# Patient Record
Sex: MALE | Race: WHITE
[De-identification: names, ages, dates, MRNs, and addresses within clinical notes are randomized per-mention and may not be internally consistent; named-entity substitution may affect disease eponyms.]

---

## 2018-01-22 ENCOUNTER — HOSPITAL ENCOUNTER (EMERGENCY)
Dept: HOSPITAL 96 - M.ERS | Age: 38
Discharge: HOME | End: 2018-01-22
Payer: COMMERCIAL

## 2018-01-22 VITALS — SYSTOLIC BLOOD PRESSURE: 134 MMHG | DIASTOLIC BLOOD PRESSURE: 80 MMHG

## 2018-01-22 VITALS — BODY MASS INDEX: 29.12 KG/M2 | WEIGHT: 214.99 LBS | HEIGHT: 72 IN

## 2018-01-22 DIAGNOSIS — Z90.49: ICD-10-CM

## 2018-01-22 DIAGNOSIS — J09.X2: Primary | ICD-10-CM

## 2018-01-22 LAB
ABSOLUTE BASOPHILS: 0.1 THOU/UL (ref 0–0.2)
ABSOLUTE EOSINOPHILS: 0.1 THOU/UL (ref 0–0.7)
ABSOLUTE MONOCYTES: 0.6 THOU/UL (ref 0–1.2)
ALBUMIN SERPL-MCNC: 3.7 G/DL (ref 3.4–5)
ALP SERPL-CCNC: 87 U/L (ref 46–116)
ALT SERPL-CCNC: 45 U/L (ref 30–65)
ANION GAP SERPL CALC-SCNC: 10 MMOL/L (ref 7–16)
AST SERPL-CCNC: 29 U/L (ref 15–37)
BASOPHILS NFR BLD AUTO: 2 %
BILIRUB SERPL-MCNC: 0.4 MG/DL
BUN SERPL-MCNC: 11 MG/DL (ref 7–18)
CALCIUM SERPL-MCNC: 8.6 MG/DL (ref 8.5–10.1)
CHLORIDE SERPL-SCNC: 102 MMOL/L (ref 98–107)
CO2 SERPL-SCNC: 27 MMOL/L (ref 21–32)
CREAT SERPL-MCNC: 1 MG/DL (ref 0.6–1.3)
EOSINOPHIL NFR BLD: 1 %
GLUCOSE SERPL-MCNC: 104 MG/DL (ref 70–99)
GRANULOCYTES NFR BLD MANUAL: 74 %
HCT VFR BLD CALC: 42.9 % (ref 42–52)
HGB BLD-MCNC: 15.2 GM/DL (ref 14–18)
LYMPHOCYTES # BLD: 1 THOU/UL (ref 0.8–5.3)
LYMPHOCYTES NFR BLD AUTO: 15 %
MCH RBC QN AUTO: 31.1 PG (ref 26–34)
MCHC RBC AUTO-ENTMCNC: 35.5 G/DL (ref 28–37)
MCV RBC: 87.8 FL (ref 80–100)
MONOCYTES NFR BLD: 8 %
MPV: 10.3 FL. (ref 7.2–11.1)
NEUTROPHILS # BLD: 5.1 THOU/UL (ref 1.6–8.1)
NUCLEATED RBCS: 0 /100WBC
PLATELET # BLD EST: ADEQUATE 10*3/UL
PLATELET COUNT*: 166 THOU/UL (ref 150–400)
POTASSIUM SERPL-SCNC: 3.8 MMOL/L (ref 3.5–5.1)
PROT SERPL-MCNC: 6.8 G/DL (ref 6.4–8.2)
RBC # BLD AUTO: 4.88 MIL/UL (ref 4.5–6)
RBC MORPH BLD: NORMAL
RDW-CV: 13.1 % (ref 10.5–14.5)
SODIUM SERPL-SCNC: 139 MMOL/L (ref 136–145)
WBC # BLD AUTO: 6.9 THOU/UL (ref 4–11)

## 2018-07-10 ENCOUNTER — HOSPITAL ENCOUNTER (EMERGENCY)
Dept: HOSPITAL 96 - M.ERS | Age: 38
Discharge: HOME | End: 2018-07-10
Payer: COMMERCIAL

## 2018-07-10 VITALS — DIASTOLIC BLOOD PRESSURE: 80 MMHG | SYSTOLIC BLOOD PRESSURE: 136 MMHG

## 2018-07-10 VITALS — BODY MASS INDEX: 29.66 KG/M2 | HEIGHT: 72 IN | WEIGHT: 219.01 LBS

## 2018-07-10 DIAGNOSIS — M25.512: Primary | ICD-10-CM

## 2018-07-10 DIAGNOSIS — Z90.49: ICD-10-CM

## 2018-07-10 LAB
ABSOLUTE BASOPHILS: 0.1 THOU/UL (ref 0–0.2)
ABSOLUTE EOSINOPHILS: 0.3 THOU/UL (ref 0–0.7)
ABSOLUTE MONOCYTES: 0.7 THOU/UL (ref 0–1.2)
ALBUMIN SERPL-MCNC: 3.8 G/DL (ref 3.4–5)
ALP SERPL-CCNC: 87 U/L (ref 46–116)
ALT SERPL-CCNC: 49 U/L (ref 30–65)
ANION GAP SERPL CALC-SCNC: 2 MMOL/L (ref 7–16)
APTT BLD: 27.3 SECONDS (ref 25–31.3)
AST SERPL-CCNC: 31 U/L (ref 15–37)
BASOPHILS NFR BLD AUTO: 0.8 %
BILIRUB SERPL-MCNC: 0.3 MG/DL
BUN SERPL-MCNC: 19 MG/DL (ref 7–18)
CALCIUM SERPL-MCNC: 8.7 MG/DL (ref 8.5–10.1)
CHLORIDE SERPL-SCNC: 106 MMOL/L (ref 98–107)
CK-MB MASS: 2.1 NG/ML
CO2 SERPL-SCNC: 29 MMOL/L (ref 21–32)
CREAT SERPL-MCNC: 0.9 MG/DL (ref 0.6–1.3)
EOSINOPHIL NFR BLD: 3.7 %
GLUCOSE SERPL-MCNC: 98 MG/DL (ref 70–99)
GRANULOCYTES NFR BLD MANUAL: 63.1 %
HCT VFR BLD CALC: 44.3 % (ref 42–52)
HGB BLD-MCNC: 15.1 GM/DL (ref 14–18)
INR PPP: 1
LIPASE: 106 U/L (ref 73–393)
LYMPHOCYTES # BLD: 2 THOU/UL (ref 0.8–5.3)
LYMPHOCYTES NFR BLD AUTO: 24.6 %
MAGNESIUM SERPL-MCNC: 2 MG/DL (ref 1.8–2.4)
MCH RBC QN AUTO: 31.5 PG (ref 26–34)
MCHC RBC AUTO-ENTMCNC: 34 G/DL (ref 28–37)
MCV RBC: 92.5 FL (ref 80–100)
MONOCYTES NFR BLD: 7.8 %
MPV: 10.6 FL. (ref 7.2–11.1)
NEUTROPHILS # BLD: 5.3 THOU/UL (ref 1.6–8.1)
NT-PRO BRAIN NAT PEPTIDE: 27 PG/ML (ref ?–300)
NUCLEATED RBCS: 0 /100WBC
PLATELET COUNT*: 191 THOU/UL (ref 150–400)
POTASSIUM SERPL-SCNC: 3.8 MMOL/L (ref 3.5–5.1)
PROT SERPL-MCNC: 7.2 G/DL (ref 6.4–8.2)
PROTHROMBIN TIME: 10.1 SECONDS (ref 9.2–11.5)
RBC # BLD AUTO: 4.79 MIL/UL (ref 4.5–6)
RDW-CV: 13.1 % (ref 10.5–14.5)
SODIUM SERPL-SCNC: 137 MMOL/L (ref 136–145)
TROPONIN-I LEVEL: <0.06 NG/ML (ref ?–0.06)
WBC # BLD AUTO: 8.3 THOU/UL (ref 4–11)

## 2018-07-12 NOTE — EKG
[place] [address]
Phone:  [phone]                     ELECTROCARDIOGRAM REPORT      
_______________________________________________________________________________
 
Name:       VERENA BARFIELD JR              Room:                      [facility]#:  [id number]      Account #:      [id number]  
Admission:  07/10/18     Attend Phys:                         
Discharge:  07/10/18     Date of Birth:  [birth year]  
         Report #: [id number]
    [id number]
_______________________________________________________________________________
THIS REPORT FOR:  //name//                      
 
                         [facility] ED
                                       
Test Date:    2018-07-10               Test Time:    17:55:19
Pat Name:     VERENA BARFIELD             Department:   
Patient ID:   [id number]            Room:          
Gender:       M                        Technician:   
:          1980               Requested By: Jose Eduardo Jackson
Order Number: [id number] MD:   Jomar Loja
                                 Measurements
Intervals                              Axis          
Rate:         71                       P:            -15
WY:           122                      QRS:          58
QRSD:         97                       T:            2
QT:           357                                    
QTc:          388                                    
                           Interpretive Statements
Sinus rhythm
No previous ECG available for comparison
 
Electronically Signed On 2018 12:53:13 CDT by Jomar Loja
[email or web address]
 
 
 
 
 
 
 
 
 
 
 
 
 
 
 
 
 
 
 
 
  <ELECTRONICALLY SIGNED>
                                           By: Jomar Loja MD, [facility]     
  18     1253
D: 07/10/18 1755   _____________________________________
T: 07/10/18 1755   Jomar Loja MD, FACC       /EPI

## 2018-08-21 ENCOUNTER — HOSPITAL ENCOUNTER (OUTPATIENT)
Dept: HOSPITAL 96 - M.CRD | Age: 38
End: 2018-08-21
Attending: INTERNAL MEDICINE
Payer: COMMERCIAL

## 2018-08-21 DIAGNOSIS — I65.23: Primary | ICD-10-CM

## 2018-08-21 DIAGNOSIS — R07.9: ICD-10-CM

## 2018-08-21 DIAGNOSIS — E78.5: ICD-10-CM

## 2018-08-21 LAB
CHOLEST SERPL-MCNC: 221 MG/DL (ref ?–200)
HDLC SERPL-MCNC: 36 MG/DL (ref 40–?)
LDLC SERPL-MCNC: 168 MG/DL (ref ?–100)
TC:HDL: 6.1 RATIO
TRIGL SERPL-MCNC: 87 MG/DL (ref ?–150)
VLDLC SERPL CALC-MCNC: 17 MG/DL (ref ?–40)

## 2018-08-21 NOTE — EXE
[place] [address]
Phone:  [phone]                     STRESS ECHOCARDIOGRAM         
_______________________________________________________________________________
 
Name:         VERENA BARFIELD JR             Room:                     REG CLI
M.R.#:    [id number]     Account #:     [id number]  
Admission:    08/21/18    Attend Phys:   Jason Sanches,
Discharge:                Date of Birth: [birth year]  
Date of Service: 08/21/18 1627  Report #:      [id number]
        [id number]
_______________________________________________________________________________
THIS REPORT FOR:  //name//                      
 
 
--------------- APPROVED REPORT --------------
 
 
Study performed:  08/21/2018 11:31:04
 
Exam:  Stress Echocardiogram
Indication: Chest pain , Dyspnea
Patient Location: Out-Patient
Stress Nurse: Tari Downs RN
Supervising Physician: Jason Sanches MD
 
Ht: 6 ft 0 in      
HR: 61 bpm       BP: 137/87 mmHg 
 
Medical History
Cardiac Risk Factors: Tobacco History (Former), FHX of CAD, 
HTN
 
Procedure
The patient underwent an Exercise Stress Test using the Viktor 
Protocol. Blood pressure, heart rate, and EKG were monitored.
An Echocardiogram was performed by technician in four stages in quad 
fashion.  At peak stress, four selected images were obtained and 
placed side by side with resting images for comparison.
 
Stress Test Details
Stress Test:  Exercise stress testing was performed using a Viktor 
protocol.      
HR           
Resting HR:            61 bpm   Max Heart Rate (APMHR): 183 bpm  
Max HR Achieved:  171 bpm   Target HR (85% APMHR): 155 bpm  
% of APMHR:         93         
Recovery HR:            86 bpm        
HR response to stress: Normal HR response to stress      
 
BP           
Resting BP:  137/87 mmHg        
Max BP:       195/96 mmHg        
Recovery BP:       148/78 mmHg        
 
ECG           
Resting ECG:  Sinus Rhythm        
Stress ECG:     Sinus Rhythm        
 
 
[place] [address]
Phone:  [phone]                     STRESS ECHOCARDIOGRAM         
_______________________________________________________________________________
 
Name:         VERENA BARFIELD JR             Room:                     REG CLI
M.R.#:    [id number]     Account #:     [id number]  
Admission:    08/21/18    Attend Phys:   Jason Sanches,
Discharge:                Date of Birth: [birth year]  
Date of Service: 08/21/18 1627  Report #:      [id number]
        [id number]
_______________________________________________________________________________
ST Change: Normal         
Recovery ECG: Sinus Rhythm        
Recovery ST Change: Normal        
 
Clinical
Reason for Termination: Completed protocol, Maximal effort, Leg 
pain
Stress Symptoms: Leg Fatigue
Exercise duration: 10 min 02 sec
Highest Stage Achieved: Stage 4: 4.2 mph at 16% grade. 
Exercise capacity: 11.84 METs
 
Stress ECG Conclusion
negative ecg portion
 
Pre-Stress Echo
The resting Echocardiogram showed normal left ventricular 
contractility with an estimated Ejection Fraction of about 50-55%. 
 
Post-Stress Echo
LV chamber size decreases, LV ejection fraction increases, no new 
wall motion abnormalities are seen.
 
Conclusion
Clinical Response:  Non-ischemic
Exercise Capacity:  Superior
Stress ECG Response:  Non-ischemic
Stress Echo Images:  Non-ischemic
Negative treadmill stres echo for 
ischemia.
 
&lt;Conclusion&gt;
Negative treadmill stres echo for ischemia.
 
 
 
 
 
 
 
 
 
 
 
  <ELECTRONICALLY SIGNED>
                                           By: Jason Sanches MD, FACC   
  08/21/18 1627
D: 08/21/18 1627   _____________________________________
T: 08/21/18 1627   Jason Sanches MD, FACC     /INF

## 2019-07-11 ENCOUNTER — HOSPITAL ENCOUNTER (EMERGENCY)
Dept: HOSPITAL 96 - M.ERS | Age: 39
Discharge: HOME | End: 2019-07-11
Payer: COMMERCIAL

## 2019-07-11 VITALS — WEIGHT: 240 LBS | BODY MASS INDEX: 32.51 KG/M2 | HEIGHT: 72 IN

## 2019-07-11 VITALS — SYSTOLIC BLOOD PRESSURE: 124 MMHG | DIASTOLIC BLOOD PRESSURE: 74 MMHG

## 2019-07-11 DIAGNOSIS — B34.9: Primary | ICD-10-CM

## 2019-07-11 DIAGNOSIS — M48.00: ICD-10-CM

## 2019-07-11 DIAGNOSIS — Z90.49: ICD-10-CM

## 2019-07-11 LAB
ABSOLUTE BASOPHILS: 0 THOU/UL (ref 0–0.2)
ABSOLUTE EOSINOPHILS: 0.1 THOU/UL (ref 0–0.7)
ABSOLUTE MONOCYTES: 0.9 THOU/UL (ref 0–1.2)
ALBUMIN SERPL-MCNC: 3.8 G/DL (ref 3.4–5)
ALP SERPL-CCNC: 112 U/L (ref 46–116)
ALT SERPL-CCNC: 58 U/L (ref 30–65)
ANION GAP SERPL CALC-SCNC: 8 MMOL/L (ref 7–16)
AST SERPL-CCNC: 26 U/L (ref 15–37)
BASOPHILS NFR BLD AUTO: 0.3 %
BILIRUB SERPL-MCNC: 0.6 MG/DL
BILIRUB UR-MCNC: NEGATIVE MG/DL
BUN SERPL-MCNC: 18 MG/DL (ref 7–18)
CALCIUM SERPL-MCNC: 8.9 MG/DL (ref 8.5–10.1)
CHLORIDE SERPL-SCNC: 104 MMOL/L (ref 98–107)
CO2 SERPL-SCNC: 28 MMOL/L (ref 21–32)
COLOR UR: YELLOW
CREAT SERPL-MCNC: 0.9 MG/DL (ref 0.6–1.3)
EOSINOPHIL NFR BLD: 0.6 %
GLUCOSE SERPL-MCNC: 110 MG/DL (ref 70–99)
GRANULOCYTES NFR BLD MANUAL: 81.1 %
HCT VFR BLD CALC: 43.5 % (ref 42–52)
HGB BLD-MCNC: 14.5 GM/DL (ref 14–18)
KETONES UR STRIP-MCNC: NEGATIVE MG/DL
LIPASE: 62 U/L (ref 73–393)
LYMPHOCYTES # BLD: 1 THOU/UL (ref 0.8–5.3)
LYMPHOCYTES NFR BLD AUTO: 9.3 %
MCH RBC QN AUTO: 30.1 PG (ref 26–34)
MCHC RBC AUTO-ENTMCNC: 33.3 G/DL (ref 28–37)
MCV RBC: 90.5 FL (ref 80–100)
MONOCYTES NFR BLD: 8.7 %
MPV: 11.1 FL. (ref 7.2–11.1)
NEUTROPHILS # BLD: 8.3 THOU/UL (ref 1.6–8.1)
NT-PRO BRAIN NAT PEPTIDE: 28 PG/ML (ref ?–300)
NUCLEATED RBCS: 0 /100WBC
PLATELET COUNT*: 154 THOU/UL (ref 150–400)
POTASSIUM SERPL-SCNC: 3.7 MMOL/L (ref 3.5–5.1)
PROT SERPL-MCNC: 7 G/DL (ref 6.4–8.2)
PROT UR QL STRIP: NEGATIVE
RBC # BLD AUTO: 4.8 MIL/UL (ref 4.5–6)
RBC # UR STRIP: NEGATIVE /UL
RDW-CV: 13 % (ref 10.5–14.5)
SODIUM SERPL-SCNC: 140 MMOL/L (ref 136–145)
SP GR UR STRIP: 1.01 (ref 1–1.03)
TROPONIN-I LEVEL: <0.06 NG/ML (ref ?–0.06)
URINE CLARITY: CLEAR
URINE GLUCOSE-RANDOM: NEGATIVE
URINE LEUKOCYTES-REFLEX: NEGATIVE
URINE NITRITE-REFLEX: NEGATIVE
UROBILINOGEN UR STRIP-ACNC: 0.2 E.U./DL (ref 0.2–1)
WBC # BLD AUTO: 10.3 THOU/UL (ref 4–11)

## 2019-07-11 NOTE — EKG
[place] [address]
Phone:  [phone]                     ELECTROCARDIOGRAM REPORT      
_______________________________________________________________________________
 
Name:       VERENA BARFIELD JR              Room:                      REG ER  
M.R.#:  [id number]      Account #:      [id number]  
Admission:  19     Attend Phys:                         
Discharge:               Date of Birth:  [birth year]  
         Report #: [id number]
    [id number]
_______________________________________________________________________________
THIS REPORT FOR:  //name//                      
 
                         [facility] ED
                                       
Test Date:    2019               Test Time:    03:36:58
Pat Name:     VERENA BARFIELD             Department:   
Patient ID:   [id number]            Room:          
Gender:       M                        Technician:   
:          1980               Requested By: Lew Wheat
Order Number: [id number] MD:   Herman Gordillo
                                 Measurements
Intervals                              Axis          
Rate:         94                       P:            30
WA:           139                      QRS:          55
QRSD:         97                       T:            -3
QT:           343                                    
QTc:          429                                    
                           Interpretive Statements
Sinus rhythm
Low voltage, precordial leads
Borderline T abnormalities, inferior leads
Baseline wander in lead(s) V2,V3
Compared to ECG 07/10/2018 17:55:19
Low QRS voltage now present
T-wave abnormality now present
 
Electronically Signed On 2019 17:01:22 CDT by Herman Gordillo
[email or web address]
 
 
 
 
 
 
 
 
 
 
 
 
 
 
 
  <ELECTRONICALLY SIGNED>
                                           By: Herman Gordillo MD, FACC   
  19     1701
D: 19 0336   _____________________________________
T: 19 0336   Herman Gordillo MD, [facility]     /EPI